# Patient Record
Sex: MALE | Race: WHITE | NOT HISPANIC OR LATINO | URBAN - METROPOLITAN AREA
[De-identification: names, ages, dates, MRNs, and addresses within clinical notes are randomized per-mention and may not be internally consistent; named-entity substitution may affect disease eponyms.]

---

## 2017-09-26 ENCOUNTER — OFFICE VISIT (OUTPATIENT)
Dept: RETAIL CLINIC | Facility: CLINIC | Age: 32
End: 2017-09-26

## 2017-09-26 DIAGNOSIS — Z02.83 ENCOUNTER FOR DRUG SCREENING: Primary | ICD-10-CM

## 2019-01-24 ENCOUNTER — OFFICE VISIT (OUTPATIENT)
Dept: INTERNAL MEDICINE | Facility: CLINIC | Age: 34
End: 2019-01-24

## 2019-01-24 VITALS
OXYGEN SATURATION: 99 % | SYSTOLIC BLOOD PRESSURE: 130 MMHG | HEART RATE: 76 BPM | RESPIRATION RATE: 18 BRPM | DIASTOLIC BLOOD PRESSURE: 72 MMHG | BODY MASS INDEX: 29.82 KG/M2 | WEIGHT: 190 LBS | HEIGHT: 67 IN

## 2019-01-24 DIAGNOSIS — S99.921A INJURY OF RIGHT FOOT, INITIAL ENCOUNTER: Primary | ICD-10-CM

## 2019-01-24 PROCEDURE — 99213 OFFICE O/P EST LOW 20 MIN: CPT | Performed by: INTERNAL MEDICINE

## 2019-01-24 RX ORDER — MELOXICAM 15 MG/1
15 TABLET ORAL DAILY
Qty: 30 TABLET | Refills: 1 | Status: SHIPPED | OUTPATIENT
Start: 2019-01-24 | End: 2020-12-09

## 2019-01-24 NOTE — PROGRESS NOTES
Chief Complaint  Jalil Riddle is a 33 y.o. male who presents for Foot Pain (Has been hurting for a couple weeks, has been training 6-7 times weekly for triathalon. )  .    History of Present Illness   Jalil is here for acute care for a new issue.  He started training for a triathalon and has developed pain in his right foot medially and dorsally.  No swelling in the foot.  He does a lot of trail running which doesn't seem to bother it.  However, when he ran on concrete that's when he really noticed that it flared up.  He has iced it some.  Hasn't really taken nsaids because her doesn't like to take meds.      Review of Systems   Musculoskeletal: Positive for joint pain. Negative for joint swelling.       There is no problem list on file for this patient.      Past Medical History:   Diagnosis Date   • DDD (degenerative disc disease), thoracic    • Pruritus        No past surgical history on file.    No family history on file.    Social History     Socioeconomic History   • Marital status: Single     Spouse name: Not on file   • Number of children: Not on file   • Years of education: Not on file   • Highest education level: Not on file   Social Needs   • Financial resource strain: Not on file   • Food insecurity - worry: Not on file   • Food insecurity - inability: Not on file   • Transportation needs - medical: Not on file   • Transportation needs - non-medical: Not on file   Occupational History   • Not on file   Tobacco Use   • Smoking status: Former Smoker     Types: Cigarettes   • Tobacco comment: socially smoked when patient was a teen   Substance and Sexual Activity   • Alcohol use: No   • Drug use: No   • Sexual activity: Not on file   Other Topics Concern   • Not on file   Social History Narrative   • Not on file       No current outpatient medications on file prior to visit.     No current facility-administered medications on file prior to visit.        Allergies   Allergen Reactions   • Sulfa  "Antibiotics      Patient cannot remember reaction       Vitals:    01/24/19 0906   BP: 130/72   Pulse: 76   Resp: 18   SpO2: 99%   Weight: 86.2 kg (190 lb)   Height: 170.2 cm (67\")       Body mass index is 29.76 kg/m².    Objective   Physical Exam   Constitutional: He is oriented to person, place, and time. He appears well-developed and well-nourished. No distress.   Musculoskeletal:        Right foot: There is tenderness and bony tenderness. There is normal range of motion and no swelling.     Vascular Status -  His right foot exhibits normal foot vasculature  and no edema.  Skin Integrity  -  His right foot skin is intact..     Neurological: He is alert and oriented to person, place, and time. No cranial nerve deficit.       No results found for this or any previous visit.    Assessment/Plan   Diagnoses and all orders for this visit:    Injury of right foot, initial encounter  -     Ambulatory Referral to Sports Medicine    Other orders  -     meloxicam (MOBIC) 15 MG tablet; Take 1 tablet by mouth Daily.        Discussion/Summary  Jalil is here for acute care for a new issue.  I would like for him to see sports medicine.  I have sent in meloxicam for him to use daily and have advised him to ice his foot twice a day.    No Follow-up on file.        "

## 2019-02-07 ENCOUNTER — OFFICE VISIT (OUTPATIENT)
Dept: SPORTS MEDICINE | Facility: CLINIC | Age: 34
End: 2019-02-07

## 2019-02-07 VITALS
WEIGHT: 190 LBS | DIASTOLIC BLOOD PRESSURE: 84 MMHG | BODY MASS INDEX: 29.82 KG/M2 | SYSTOLIC BLOOD PRESSURE: 120 MMHG | HEIGHT: 67 IN

## 2019-02-07 DIAGNOSIS — M79.671 RIGHT FOOT PAIN: Primary | ICD-10-CM

## 2019-02-07 DIAGNOSIS — M77.8 FLEXOR HALLUCIS LONGUS TENDONITIS: ICD-10-CM

## 2019-02-07 PROCEDURE — 99244 OFF/OP CNSLTJ NEW/EST MOD 40: CPT | Performed by: FAMILY MEDICINE

## 2019-02-07 PROCEDURE — 73630 X-RAY EXAM OF FOOT: CPT | Performed by: FAMILY MEDICINE

## 2019-03-14 ENCOUNTER — TELEPHONE (OUTPATIENT)
Dept: SPORTS MEDICINE | Facility: CLINIC | Age: 34
End: 2019-03-14

## 2020-12-09 ENCOUNTER — OFFICE VISIT (OUTPATIENT)
Dept: INTERNAL MEDICINE | Facility: CLINIC | Age: 35
End: 2020-12-09

## 2020-12-09 VITALS — BODY MASS INDEX: 29.82 KG/M2 | TEMPERATURE: 98.7 F | WEIGHT: 190 LBS | HEIGHT: 67 IN

## 2020-12-09 DIAGNOSIS — R21 RASH: Primary | ICD-10-CM

## 2020-12-09 DIAGNOSIS — L74.513 HYPERHIDROSIS OF FEET: ICD-10-CM

## 2020-12-09 PROCEDURE — 99213 OFFICE O/P EST LOW 20 MIN: CPT | Performed by: PHYSICIAN ASSISTANT

## 2020-12-09 NOTE — PROGRESS NOTES
Subjective   Chief Complaint   Patient presents with   • Rash     Np- right inner thigh       History of Present Illness      Pt is a 35 year old white male who presents today to establish care as a new patient with a complaint of a rash on his right genitals and feet. He states the rash on his genitals has been present for a few weeks. It was vesicular in nature and has been itching. He thought it was fungus and started using lotrimin which has helped. It is improving. He is not presently sexually active and has no history of herpes.     He also has blisters on his feet sometimes, but none presently. He does have excessive sweating of his feet and has tried changing socks frequently, using powders etc with no improvement. Due to the moisture he has mild toenail fungus.     There is no problem list on file for this patient.      Allergies   Allergen Reactions   • Sulfa Antibiotics      Patient cannot remember reaction       Current Outpatient Medications on File Prior to Visit   Medication Sig Dispense Refill   • [DISCONTINUED] meloxicam (MOBIC) 15 MG tablet Take 1 tablet by mouth Daily. 30 tablet 1     No current facility-administered medications on file prior to visit.        Past Medical History:   Diagnosis Date   • DDD (degenerative disc disease), thoracic    • Pruritus        Family History   Family history unknown: Yes       Social History     Socioeconomic History   • Marital status: Single     Spouse name: Not on file   • Number of children: Not on file   • Years of education: Not on file   • Highest education level: Not on file   Tobacco Use   • Smoking status: Never Smoker   • Smokeless tobacco: Never Used   • Tobacco comment: socially smoked when patient was a teen   Substance and Sexual Activity   • Alcohol use: Not Currently   • Drug use: No   • Sexual activity: Defer       Past Surgical History:   Procedure Laterality Date   • EAR TUBES     • TONSILLECTOMY     • WISDOM TOOTH EXTRACTION           The  "following portions of the patient's history were reviewed and updated as appropriate: problem list, allergies, current medications, past medical history, past family history, past social history and past surgical history.    Review of Systems   Skin: Positive for rash.        Excessively sweaty feet.        Immunization History   Administered Date(s) Administered   • Hepatitis A 02/09/2018, 10/09/2020   • Tdap 12/09/2018       Objective   Vitals:    12/09/20 0833   Temp: 98.7 °F (37.1 °C)   Weight: 86.2 kg (190 lb)   Height: 170.2 cm (67\")     Body mass index is 29.76 kg/m².  Physical Exam  Vitals signs reviewed.   Constitutional:       Appearance: Normal appearance.   Skin:     Comments: Small erythematous macules right side of groin. No vesicles presently. Thickening of large toenails at edges.    Neurological:      Mental Status: He is alert.       Assessment/Plan   Diagnoses and all orders for this visit:    1. Rash (Primary)  -     HSV 1 & 2 IgM, Antibodies, Indirect    2. Hyperhidrosis of feet  -     Glycopyrronium Tosylate 2.4 % pads; Apply 2.4 applicators topically Every Night.  Dispense: 30 each; Refill: 11    Continue lotrimin for rash as has been helping, though may be HSV2 will get labs today. Start Qbrexa topically to soles of feet for hyperhidrosis.     Return for Lab Today.           "

## 2020-12-10 LAB
HSV1 IGM TITR SER IF: NORMAL TITER
HSV2 IGM TITR SER IF: NORMAL TITER

## 2021-01-26 ENCOUNTER — TELEPHONE (OUTPATIENT)
Dept: INTERNAL MEDICINE | Facility: CLINIC | Age: 36
End: 2021-01-26

## 2021-01-26 NOTE — TELEPHONE ENCOUNTER
I received an email from [facility] billing office that patient requested a call back in regards to his bill for services from Silva Velazco.     The patient did not feel he should owe any money because he feels Silva did not help his problem.     I informed patient Silva used her clinical judgement and treated him as she felt he needed. We unfortunately cannot remove the charges.  Patient said he did end paying the bill but will be finding another provider. I apologized that he felt that way.

## 2021-04-16 ENCOUNTER — BULK ORDERING (OUTPATIENT)
Dept: CASE MANAGEMENT | Facility: OTHER | Age: 36
End: 2021-04-16

## 2021-04-16 DIAGNOSIS — Z23 IMMUNIZATION DUE: ICD-10-CM

## 2021-06-06 NOTE — PROGRESS NOTES
"Jalil is a 33 y.o. year old male    Chief Complaint   Patient presents with   • Foot Pain     R foot pain, Arch of foot, x 1 week, no previous x-rays       History of Present Illness  HPI   Jalil is here today for right foot pain, referred by Dr. Elba Pedraza.  He describes this pain as sharp, mild to moderately severe, getting about 2 weeks ago after running.  He has been active exercising for years, but recently started changing his exercise to train for [employer].  Notably the day that his pain began he changed from running on trails to running on concrete with thin, flexible shoes.  This was mild enough that he runs a day.  He has rested from running since which has alleviated pain, also notes significant pain relief with use of meloxicam prescribed by Dr. Pedraza.  He states he stopped taking it about 5 days ago and just today started having recurrent pain.  However, he has not been running, but is up on his feet working and has continued cycling and swimming.  Pain radiates somewhat to the midfoot.  No associated numbness or tingling.    I have reviewed the patient's medical, family, and social history in detail and updated the computerized patient record.    Review of Systems   Constitutional: Negative for fever.   Musculoskeletal:        Per HPI   Skin: Negative for wound.   Neurological: Negative for numbness.   All other systems reviewed and are negative.      /84 (BP Location: Left arm, Patient Position: Sitting, Cuff Size: Large Adult)   Ht 170.2 cm (67.01\")   Wt 86.2 kg (190 lb)   BMI 29.75 kg/m²      Physical Exam    Vital signs reviewed.   General: No acute distress.  Eyes: conjunctiva clear; pupils equally round and reactive  ENT: external ears and nose atraumatic; oropharynx clear  CV: no peripheral edema, 2+ distal pulses  Resp: normal respiratory effort, no use of accessory muscles  Skin: no rashes or wounds; normal turgor  Psych: mood and affect appropriate; recent and remote memory " intact  Neuro: sensation to light touch intact    MSK Exam:  Ortho Exam  Right foot: Normal appearance.  There is tenderness to palpation along the flexor hallucis longus tendon just below the navicular bone.  There is no bony tenderness on the navicular, cuneiforms, or metatarsals.  No tenderness on the posterior tibialis.  Normal range of motion.  Normal strength.  No ligamentous laxity.    Right Foot X-Ray  Indication: Pain  AP, Lateral, and Oblique views    Findings:  No fracture  No bony lesion  Normal soft tissues  Normal joint spaces    No prior studies were available for comparison.     Bedside musculoskeletal ultrasound is largely unremarkable, although there does appear to be a small amount of hypoechoic signal around the flexor hallucis longus tendon consistent with tendinitis/synovitis.    Diagnoses and all orders for this visit:    Right foot pain  -     XR Foot 3+ View Right    Flexor hallucis longus tendonitis      History and exam most consistent with flexor hallucis longus tendinitis, he can also potentially have medial plantar aponeurosis pain.  I think this is largely related to the changes in his activity level, particularly with changing from trail running to concrete running with minimally supportive footwear.  Mechanical support from trail shoes would be vastly different from highly flexible shoes he describes using on concrete.  While minimalist footwear can be a safe option, the changes back-and-forth appear to have led to rather acute overuse of his foot support in his medial arch.  We also note some slight external rotation of the foot and heavy pronation on the right side which could contribute to this as well.  He is going to continue meloxicam as prescribed, more supportive footwear and use gradual transition with walking to jogging to get back to running.  If he fails to improve we may need to consider an MRI to rule out occult bone stress injury, although that appears very consistent  with today's evaluation.  We'll schedule tentative follow-up in 4 weeks, although if he is not progressing before then I would like him to call me.    EMR Dragon/Transcription disclaimer:    Much of this encounter note is an electronic transcription/translation of spoken language to printed text.  The electronic translation of spoken language may permit erroneous, or at times, nonsensical words or phrases to be inadvertently transcribed.  Although I have reviewed the note for such errors some may still exist.     XR concerning for inflammatory arthritis. Left wrist and MCP joints swollen, minimal effusions, no rashses. Hx of Gout previously, taken off allopurinol maintenance in the setting of the patient's worsening renal function. DDx includes gout vs. RA vs. possible septic joint. Most likely gout flare given mono-articular nature of the patient's gout.   -C/w medrol dose pack   -Rheum consult in the am  -Avoid NSAIDs in the setting of RUSSELL  -Monitor pain